# Patient Record
Sex: FEMALE | Race: WHITE | URBAN - NONMETROPOLITAN AREA
[De-identification: names, ages, dates, MRNs, and addresses within clinical notes are randomized per-mention and may not be internally consistent; named-entity substitution may affect disease eponyms.]

---

## 2020-07-07 ENCOUNTER — OFFICE VISIT (OUTPATIENT)
Dept: URBAN - NONMETROPOLITAN AREA CLINIC 2 | Facility: CLINIC | Age: 20
End: 2020-07-07

## 2020-08-05 ENCOUNTER — ERX REFILL RESPONSE (OUTPATIENT)
Age: 20
End: 2020-08-05

## 2020-08-05 RX ORDER — DEXLANSOPRAZOLE 60 MG/1
TAKE 1 CAPSULE BY MOUTH EVERY DAY CAPSULE, DELAYED RELEASE ORAL
Qty: 90 | Refills: 3

## 2020-09-04 ENCOUNTER — OFFICE VISIT (OUTPATIENT)
Dept: URBAN - NONMETROPOLITAN AREA CLINIC 13 | Facility: CLINIC | Age: 20
End: 2020-09-04
Payer: COMMERCIAL

## 2020-09-04 DIAGNOSIS — R19.7 DIARRHEA: ICD-10-CM

## 2020-09-04 DIAGNOSIS — K31.84 GASTROPARESIS: ICD-10-CM

## 2020-09-04 DIAGNOSIS — K21.0 GASTROESOPHAGEAL REFLUX DISEASE WITH ESOPHAGITIS: ICD-10-CM

## 2020-09-04 DIAGNOSIS — R11.10 REGURGITATION: ICD-10-CM

## 2020-09-04 DIAGNOSIS — K59.00 CONSTIPATION: ICD-10-CM

## 2020-09-04 PROCEDURE — G9903 PT SCRN TBCO ID AS NON USER: HCPCS | Performed by: INTERNAL MEDICINE

## 2020-09-04 PROCEDURE — G8420 CALC BMI NORM PARAMETERS: HCPCS | Performed by: INTERNAL MEDICINE

## 2020-09-04 PROCEDURE — G8427 DOCREV CUR MEDS BY ELIG CLIN: HCPCS | Performed by: INTERNAL MEDICINE

## 2020-09-04 PROCEDURE — 99214 OFFICE O/P EST MOD 30 MIN: CPT | Performed by: INTERNAL MEDICINE

## 2020-09-04 RX ORDER — IBUPROFEN 800 MG/1
TABLET ORAL
Qty: 0 | Refills: 0 | Status: ACTIVE | COMMUNITY
Start: 1900-01-01

## 2020-09-04 RX ORDER — DEXLANSOPRAZOLE 60 MG/1
TAKE 1 CAPSULE BY MOUTH EVERY DAY CAPSULE, DELAYED RELEASE ORAL
Qty: 90 | Refills: 3 | Status: ACTIVE | COMMUNITY

## 2020-09-04 RX ORDER — SERTRALINE HYDROCHLORIDE 112 UG/1
TAKE 2 TABLETS (224 MCG) BY ORAL ROUTE ONCE DAILY TABLET ORAL 1
Qty: 0 | Refills: 0 | Status: ACTIVE | COMMUNITY
Start: 1900-01-01

## 2020-09-04 RX ORDER — IBUPROFEN 200 MG/1
TABLET, COATED ORAL
Qty: 0 | Refills: 0 | Status: ACTIVE | COMMUNITY
Start: 1900-01-01

## 2020-09-04 RX ORDER — NAPROXEN SODIUM 220 MG
TABLET ORAL
Qty: 0 | Refills: 0 | Status: ACTIVE | COMMUNITY
Start: 1900-01-01

## 2020-09-04 NOTE — HPI-OTHER HISTORIES
12/3/19   Mandi presents for follow up of reflux, regurgitation, and gastroparesis. She is on reglan 10mg before supper and is eating small frequent meals. Her nausea has resolved and she is down to only regurgitating occasionally after a larger meal, she is focusing on eating small meals.  She feels that the Dexilant helped with the morning sympotms.  The motegrity has helped tremendously with the constipation and she has more of an appetite on this medication. MB

## 2020-09-04 NOTE — HPI-TODAY'S VISIT:
The patient presents today for follow-up of her gastroparesis and constipation.  Since our last visit, she has been doing fairly well on Dexilant 60 mg daily and Motegrity.  She has recently started that school, and her bowels have become more loose.  She has diarrhea on some mornings.  This usually occurs after eating.  She is also having increased nausea in the evenings.  She will feel hungry, but she is unable to eat anything, because it makes her more nauseated.  She has stopped taking the Reglan, because she felt that this gave her too many side effects.  She feels like overall, her symptoms are much improved from where they were previously, and she wants to maintain on her current medications.  We have discussed weaning off of the Dexilant, but she does not feel that she can wean down at this time.

## 2020-09-15 ENCOUNTER — ERX REFILL RESPONSE (OUTPATIENT)
Age: 20
End: 2020-09-15

## 2020-09-15 RX ORDER — PRUCALOPRIDE 2 MG/1
TAKE 1 TABLET BY MOUTH EVERY DAY TABLET, FILM COATED ORAL
Qty: 90 | Refills: 3

## 2021-01-08 ENCOUNTER — OFFICE VISIT (OUTPATIENT)
Dept: URBAN - NONMETROPOLITAN AREA CLINIC 13 | Facility: CLINIC | Age: 21
End: 2021-01-08

## 2021-04-09 ENCOUNTER — OFFICE VISIT (OUTPATIENT)
Dept: URBAN - NONMETROPOLITAN AREA CLINIC 13 | Facility: CLINIC | Age: 21
End: 2021-04-09
Payer: COMMERCIAL

## 2021-04-09 DIAGNOSIS — R11.10 REGURGITATION: ICD-10-CM

## 2021-04-09 DIAGNOSIS — K31.84 GASTROPARESIS: ICD-10-CM

## 2021-04-09 DIAGNOSIS — K59.09 CHRONIC CONSTIPATION: ICD-10-CM

## 2021-04-09 DIAGNOSIS — K21.00 CHRONIC REFLUX ESOPHAGITIS: ICD-10-CM

## 2021-04-09 PROCEDURE — 99214 OFFICE O/P EST MOD 30 MIN: CPT | Performed by: INTERNAL MEDICINE

## 2021-04-09 RX ORDER — DEXLANSOPRAZOLE 60 MG/1
TAKE 1 CAPSULE BY MOUTH EVERY DAY CAPSULE, DELAYED RELEASE ORAL
Qty: 90 | Refills: 3 | Status: ACTIVE | COMMUNITY

## 2021-04-09 RX ORDER — SERTRALINE HYDROCHLORIDE 112 UG/1
TAKE 2 TABLETS (224 MCG) BY ORAL ROUTE ONCE DAILY TABLET ORAL 1
Qty: 0 | Refills: 0 | Status: ON HOLD | COMMUNITY
Start: 1900-01-01

## 2021-04-09 RX ORDER — NAPROXEN SODIUM 220 MG
TABLET ORAL
Qty: 0 | Refills: 0 | Status: ACTIVE | COMMUNITY
Start: 1900-01-01

## 2021-04-09 RX ORDER — PRUCALOPRIDE 2 MG/1
TAKE 1 TABLET BY MOUTH EVERY DAY TABLET, FILM COATED ORAL
Qty: 90 | Refills: 3 | Status: ACTIVE | COMMUNITY

## 2021-04-09 RX ORDER — DEXLANSOPRAZOLE 60 MG/1
TAKE 1 CAPSULE BY MOUTH EVERY DAY CAPSULE, DELAYED RELEASE ORAL
OUTPATIENT

## 2021-04-09 RX ORDER — IBUPROFEN 800 MG/1
TABLET ORAL
Qty: 0 | Refills: 0 | Status: ACTIVE | COMMUNITY
Start: 1900-01-01

## 2021-04-09 RX ORDER — IBUPROFEN 200 MG/1
TABLET, COATED ORAL
Qty: 0 | Refills: 0 | Status: ACTIVE | COMMUNITY
Start: 1900-01-01

## 2021-04-09 NOTE — HPI-TODAY'S VISIT:
The patient presents today for follow-up of her gastroparesis and constipation.  Since our last visit, she has been doing fairly well on Dexilant 60 mg daily and Motegrity.  She has recently started that school, and her bowels have become more loose.  She has diarrhea on some mornings.  This usually occurs after eating.  She is also having increased nausea in the evenings.  She will feel hungry, but she is unable to eat anything, because it makes her more nauseated.  She has stopped taking the Reglan, because she felt that this gave her too many side effects.  She feels like overall, her symptoms are much improved from where they were previously, and she wants to maintain on her current medications.  We have discussed weaning off of the Dexilant, but she does not feel that she can wean down at this time. 4-9-21 The patient presents today for follow-up of her gastroparesis, reflux, and constipation.  Since our last visit, she has been doing fairly well.  She feels that her symptoms are still under good control.  She is taking the Motegrity, and she has a bowel movement the same time every day.  She does continue to have occasional regurgitation if she eats a large high-fiber meal.  She denies any significant reflux, and she is willing to try to wean down on her Dexilant to 30 mg.  We have discussed again her symptoms, and she does feel like things are under good control.  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

## 2021-09-14 ENCOUNTER — TELEPHONE ENCOUNTER (OUTPATIENT)
Dept: URBAN - NONMETROPOLITAN AREA CLINIC 2 | Facility: CLINIC | Age: 21
End: 2021-09-14

## 2021-09-14 RX ORDER — PRUCALOPRIDE 2 MG/1
TAKE 1 TABLET BY MOUTH EVERY DAY TABLET, FILM COATED ORAL
Qty: 90 | Refills: 3
End: 2022-09-09

## 2021-10-08 ENCOUNTER — WEB ENCOUNTER (OUTPATIENT)
Dept: URBAN - NONMETROPOLITAN AREA CLINIC 13 | Facility: CLINIC | Age: 21
End: 2021-10-08

## 2021-10-08 ENCOUNTER — OFFICE VISIT (OUTPATIENT)
Dept: URBAN - NONMETROPOLITAN AREA CLINIC 13 | Facility: CLINIC | Age: 21
End: 2021-10-08
Payer: COMMERCIAL

## 2021-10-08 VITALS
WEIGHT: 122 LBS | SYSTOLIC BLOOD PRESSURE: 128 MMHG | TEMPERATURE: 97.7 F | DIASTOLIC BLOOD PRESSURE: 69 MMHG | HEART RATE: 81 BPM | BODY MASS INDEX: 20.83 KG/M2 | HEIGHT: 64 IN

## 2021-10-08 DIAGNOSIS — K31.84 GASTROPARESIS: ICD-10-CM

## 2021-10-08 DIAGNOSIS — R19.7 DIARRHEA: ICD-10-CM

## 2021-10-08 DIAGNOSIS — R11.10 REGURGITATION: ICD-10-CM

## 2021-10-08 DIAGNOSIS — K59.00 CONSTIPATION: ICD-10-CM

## 2021-10-08 DIAGNOSIS — K21.0 GASTROESOPHAGEAL REFLUX DISEASE WITH ESOPHAGITIS: ICD-10-CM

## 2021-10-08 PROCEDURE — 99214 OFFICE O/P EST MOD 30 MIN: CPT | Performed by: INTERNAL MEDICINE

## 2021-10-08 RX ORDER — NAPROXEN SODIUM 220 MG
TABLET ORAL
Qty: 0 | Refills: 0 | Status: ACTIVE | COMMUNITY
Start: 1900-01-01

## 2021-10-08 RX ORDER — PRUCALOPRIDE 2 MG/1
TAKE 1 TABLET BY MOUTH EVERY DAY TABLET, FILM COATED ORAL
Qty: 90 | Refills: 3 | Status: ACTIVE | COMMUNITY
End: 2022-09-09

## 2021-10-08 RX ORDER — IBUPROFEN 800 MG/1
TABLET ORAL
Qty: 0 | Refills: 0 | Status: ACTIVE | COMMUNITY
Start: 1900-01-01

## 2021-10-08 RX ORDER — SERTRALINE HYDROCHLORIDE 112 UG/1
TAKE 2 TABLETS (224 MCG) BY ORAL ROUTE ONCE DAILY TABLET ORAL 1
Qty: 0 | Refills: 0 | Status: ON HOLD | COMMUNITY
Start: 1900-01-01

## 2021-10-08 RX ORDER — IBUPROFEN 200 MG/1
TABLET, COATED ORAL
Qty: 0 | Refills: 0 | Status: ACTIVE | COMMUNITY
Start: 1900-01-01

## 2021-10-08 NOTE — HPI-TODAY'S VISIT:
The patient presents today for follow-up of her gastroparesis and constipation.  Since our last visit, she has been doing fairly well on Dexilant 60 mg daily and Motegrity.  She has recently started that school, and her bowels have become more loose.  She has diarrhea on some mornings.  This usually occurs after eating.  She is also having increased nausea in the evenings.  She will feel hungry, but she is unable to eat anything, because it makes her more nauseated.  She has stopped taking the Reglan, because she felt that this gave her too many side effects.  She feels like overall, her symptoms are much improved from where they were previously, and she wants to maintain on her current medications.  We have discussed weaning off of the Dexilant, but she does not feel that she can wean down at this time. 4-9-21 The patient presents today for follow-up of her gastroparesis, reflux, and constipation.  Since our last visit, she has been doing fairly well.  She feels that her symptoms are still under good control.  She is taking the Motegrity, and she has a bowel movement the same time every day.  She does continue to have occasional regurgitation if she eats a large high-fiber meal.  She denies any significant reflux, and she is willing to try to wean down on her Dexilant to 30 mg.  We have discussed again her symptoms, and she does feel like things are under good control.  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 10/8/2021 The patient presents today for follow-up of her gastroparesis, reflux, and constipation.  Since her last visit, her symptoms have been stable.  She is on Motegrity and Dexilant 30 mg daily.  Her biggest issue is that she cannot add things back into her diet.  Whenever she tries to eat cruciferous vegetables, she has extreme abdominal pain.  She is also unable to tolerate certain fruits.  We have discussed trying to blenderized these and add them in a smoothie.  In the meantime, we are going to continue her Dexilant and her Motegrity.  We are going to try to wean her down to 20 mg of pantoprazole on her follow-up visit.  stretcher

## 2021-10-10 ENCOUNTER — TELEPHONE ENCOUNTER (OUTPATIENT)
Dept: URBAN - METROPOLITAN AREA CLINIC 92 | Facility: CLINIC | Age: 21
End: 2021-10-10

## 2021-11-18 ENCOUNTER — P2P PATIENT RECORD (OUTPATIENT)
Age: 21
End: 2021-11-18

## 2022-01-10 ENCOUNTER — TELEPHONE ENCOUNTER (OUTPATIENT)
Dept: URBAN - METROPOLITAN AREA CLINIC 23 | Facility: CLINIC | Age: 22
End: 2022-01-10

## 2022-01-10 ENCOUNTER — ERX REFILL RESPONSE (OUTPATIENT)
Dept: URBAN - NONMETROPOLITAN AREA CLINIC 2 | Facility: CLINIC | Age: 22
End: 2022-01-10

## 2022-01-10 RX ORDER — PRUCALOPRIDE 2 MG/1
1 TABLET TABLET, FILM COATED ORAL ONCE A DAY
Qty: 90 TABLET | Refills: 3

## 2022-01-10 RX ORDER — PRUCALOPRIDE 2 MG/1
1 TABLET TABLET, FILM COATED ORAL ONCE A DAY
Qty: 90 TABLET | Refills: 4 | OUTPATIENT

## 2022-01-10 RX ORDER — PRUCALOPRIDE 2 MG/1
TAKE 1 TABLET BY MOUTH EVERY DAY TABLET, FILM COATED ORAL
Qty: 90 | Refills: 3 | OUTPATIENT

## 2022-04-01 ENCOUNTER — WEB ENCOUNTER (OUTPATIENT)
Dept: URBAN - NONMETROPOLITAN AREA CLINIC 2 | Facility: CLINIC | Age: 22
End: 2022-04-01

## 2022-04-01 ENCOUNTER — OFFICE VISIT (OUTPATIENT)
Dept: URBAN - NONMETROPOLITAN AREA CLINIC 2 | Facility: CLINIC | Age: 22
End: 2022-04-01
Payer: COMMERCIAL

## 2022-04-01 DIAGNOSIS — K31.84 GASTROPARESIS: ICD-10-CM

## 2022-04-01 DIAGNOSIS — K21.00 CHRONIC REFLUX ESOPHAGITIS: ICD-10-CM

## 2022-04-01 DIAGNOSIS — R11.10 REGURGITATION: ICD-10-CM

## 2022-04-01 DIAGNOSIS — K59.09 CHRONIC CONSTIPATION: ICD-10-CM

## 2022-04-01 PROCEDURE — 99214 OFFICE O/P EST MOD 30 MIN: CPT | Performed by: INTERNAL MEDICINE

## 2022-04-01 RX ORDER — PRUCALOPRIDE 2 MG/1
1 TABLET TABLET, FILM COATED ORAL ONCE A DAY
Qty: 90 TABLET | Refills: 4
End: 2023-06-25

## 2022-04-01 RX ORDER — PRUCALOPRIDE 2 MG/1
1 TABLET TABLET, FILM COATED ORAL ONCE A DAY
Qty: 90 TABLET | Refills: 4 | Status: ACTIVE | COMMUNITY

## 2022-04-01 RX ORDER — NAPROXEN SODIUM 220 MG
TABLET ORAL
Qty: 0 | Refills: 0 | Status: ACTIVE | COMMUNITY
Start: 1900-01-01

## 2022-04-01 RX ORDER — IBUPROFEN 200 MG/1
TABLET, COATED ORAL
Qty: 0 | Refills: 0 | Status: ACTIVE | COMMUNITY
Start: 1900-01-01

## 2022-04-01 RX ORDER — IBUPROFEN 800 MG/1
TABLET ORAL
Qty: 0 | Refills: 0 | Status: ACTIVE | COMMUNITY
Start: 1900-01-01

## 2022-04-01 RX ORDER — PANTOPRAZOLE SODIUM 20 MG/1
1 TABLET TABLET, DELAYED RELEASE ORAL TWICE A DAY
Qty: 180 TABLET | Refills: 3 | OUTPATIENT
Start: 2022-04-01

## 2022-04-01 RX ORDER — SERTRALINE HYDROCHLORIDE 112 UG/1
TAKE 2 TABLETS (224 MCG) BY ORAL ROUTE ONCE DAILY TABLET ORAL 1
Qty: 0 | Refills: 0 | Status: ON HOLD | COMMUNITY
Start: 1900-01-01

## 2022-04-01 NOTE — HPI-TODAY'S VISIT:
The patient presents today for follow-up of her gastroparesis and constipation.  Since our last visit, she has been doing fairly well on Dexilant 60 mg daily and Motegrity.  She has recently started that school, and her bowels have become more loose.  She has diarrhea on some mornings.  This usually occurs after eating.  She is also having increased nausea in the evenings.  She will feel hungry, but she is unable to eat anything, because it makes her more nauseated.  She has stopped taking the Reglan, because she felt that this gave her too many side effects.  She feels like overall, her symptoms are much improved from where they were previously, and she wants to maintain on her current medications.  We have discussed weaning off of the Dexilant, but she does not feel that she can wean down at this time. 4-9-21 The patient presents today for follow-up of her gastroparesis, reflux, and constipation.  Since our last visit, she has been doing fairly well.  She feels that her symptoms are still under good control.  She is taking the Motegrity, and she has a bowel movement the same time every day.  She does continue to have occasional regurgitation if she eats a large high-fiber meal.  She denies any significant reflux, and she is willing to try to wean down on her Dexilant to 30 mg.  We have discussed again her symptoms, and she does feel like things are under good control.  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 10/8/2021 The patient presents today for follow-up of her gastroparesis, reflux, and constipation.  Since her last visit, her symptoms have been stable.  She is on Motegrity and Dexilant 30 mg daily.  Her biggest issue is that she cannot add things back into her diet.  Whenever she tries to eat cruciferous vegetables, she has extreme abdominal pain.  She is also unable to tolerate certain fruits.  We have discussed trying to blenderized these and add them in a smoothie.  In the meantime, we are going to continue her Dexilant and her Motegrity.  We are going to try to wean her down to 20 mg of pantoprazole on her follow-up visit. 4/1/2022 The patient presents today for follow-up of her gastroparesis, reflux, and constipation.  Since our last visit, her symptoms have been fairly stable.  She continues to try to add in fruits and vegetables to her diet, but this does tend to be difficult.  She is taking the Dexilant, and she denies any breakthrough reflux symptoms.  She does think she can wean down to 20 mg of pantoprazole.  Her bowels have been moving fairly regularly with Motegrity, but she does not have a bowel movement every day.  At this point, she agrees that her symptoms are quite stable.  She continues to work on her diet.

## 2022-06-14 ENCOUNTER — OFFICE VISIT (OUTPATIENT)
Dept: URBAN - NONMETROPOLITAN AREA CLINIC 13 | Facility: CLINIC | Age: 22
End: 2022-06-14

## 2022-10-07 ENCOUNTER — OFFICE VISIT (OUTPATIENT)
Dept: URBAN - NONMETROPOLITAN AREA CLINIC 2 | Facility: CLINIC | Age: 22
End: 2022-10-07

## 2023-01-03 ENCOUNTER — TELEPHONE ENCOUNTER (OUTPATIENT)
Dept: URBAN - NONMETROPOLITAN AREA CLINIC 2 | Facility: CLINIC | Age: 23
End: 2023-01-03

## 2023-03-27 ENCOUNTER — OFFICE VISIT (OUTPATIENT)
Dept: URBAN - NONMETROPOLITAN AREA CLINIC 2 | Facility: CLINIC | Age: 23
End: 2023-03-27
Payer: COMMERCIAL

## 2023-03-27 VITALS
HEART RATE: 64 BPM | BODY MASS INDEX: 21.85 KG/M2 | HEIGHT: 64 IN | SYSTOLIC BLOOD PRESSURE: 109 MMHG | TEMPERATURE: 98.7 F | DIASTOLIC BLOOD PRESSURE: 61 MMHG | WEIGHT: 128 LBS

## 2023-03-27 DIAGNOSIS — K21.9 GASTROESOPHAGEAL REFLUX DISEASE, UNSPECIFIED WHETHER ESOPHAGITIS PRESENT: ICD-10-CM

## 2023-03-27 DIAGNOSIS — K59.00 CONSTIPATION: ICD-10-CM

## 2023-03-27 DIAGNOSIS — K31.84 GASTROPARESIS: ICD-10-CM

## 2023-03-27 DIAGNOSIS — R11.10 REGURGITATION: ICD-10-CM

## 2023-03-27 DIAGNOSIS — R19.7 DIARRHEA: ICD-10-CM

## 2023-03-27 PROBLEM — 235595009: Status: ACTIVE | Noted: 2023-03-27

## 2023-03-27 PROCEDURE — 99214 OFFICE O/P EST MOD 30 MIN: CPT | Performed by: NURSE PRACTITIONER

## 2023-03-27 RX ORDER — IBUPROFEN 800 MG/1
TABLET ORAL
Qty: 0 | Refills: 0 | Status: ACTIVE | COMMUNITY
Start: 1900-01-01

## 2023-03-27 RX ORDER — IBUPROFEN 200 MG/1
TABLET, COATED ORAL
Qty: 0 | Refills: 0 | Status: ACTIVE | COMMUNITY
Start: 1900-01-01

## 2023-03-27 RX ORDER — PRUCALOPRIDE 2 MG/1
1 TABLET TABLET, FILM COATED ORAL ONCE A DAY
Qty: 90 TABLET | Refills: 4 | Status: ACTIVE | COMMUNITY
End: 2023-06-25

## 2023-03-27 RX ORDER — SERTRALINE HYDROCHLORIDE 112 UG/1
TAKE 2 TABLETS (224 MCG) BY ORAL ROUTE ONCE DAILY TABLET ORAL 1
Qty: 0 | Refills: 0 | Status: ON HOLD | COMMUNITY
Start: 1900-01-01

## 2023-03-27 RX ORDER — NAPROXEN SODIUM 220 MG
TABLET ORAL
Qty: 0 | Refills: 0 | Status: ACTIVE | COMMUNITY
Start: 1900-01-01

## 2023-03-27 RX ORDER — PANTOPRAZOLE SODIUM 20 MG/1
1 TABLET TABLET, DELAYED RELEASE ORAL TWICE A DAY
Qty: 180 TABLET | Refills: 3 | Status: ACTIVE | COMMUNITY
Start: 2022-04-01

## 2023-03-27 RX ORDER — PRUCALOPRIDE 2 MG/1
1 TABLET TABLET, FILM COATED ORAL ONCE A DAY
Qty: 90 TABLET | Refills: 3

## 2023-03-27 NOTE — HPI-TODAY'S VISIT:
12/3/19  Mandi presents for follow up of reflux, regurgitation, and gastroparesis. She is on reglan 10mg before supper and is eating small frequent meals. Her nausea has resolved and she is down to only regurgitating occasionally after a larger meal, she is focusing on eating small meals.  She feels that the Dexilant helped with the morning sympotms.  The motegrity has helped tremendously with the constipation and she has more of an appetite on this medication. MB  4-9-21 The patient presents today for follow-up of her gastroparesis, reflux, and constipation.  Since our last visit, she has been doing fairly well.  She feels that her symptoms are still under good control.  She is taking the Motegrity, and she has a bowel movement the same time every day.  She does continue to have occasional regurgitation if she eats a large high-fiber meal.  She denies any significant reflux, and she is willing to try to wean down on her Dexilant to 30 mg.  We have discussed again her symptoms, and she does feel like things are under good control.  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  10/8/2021 The patient presents today for follow-up of her gastroparesis, reflux, and constipation.  Since her last visit, her symptoms have been stable.  She is on Motegrity and Dexilant 30 mg daily.  Her biggest issue is that she cannot add things back into her diet.  Whenever she tries to eat cruciferous vegetables, she has extreme abdominal pain.  She is also unable to tolerate certain fruits.  We have discussed trying to blenderized these and add them in a smoothie.  In the meantime, we are going to continue her Dexilant and her Motegrity.  We are going to try to wean her down to 20 mg of pantoprazole on her follow-up visit.  4/1/2022 The patient presents today for follow-up of her gastroparesis, reflux, and constipation.  Since our last visit, her symptoms have been fairly stable.  She continues to try to add in fruits and vegetables to her diet, but this does tend to be difficult.  She is taking the Dexilant, and she denies any breakthrough reflux symptoms.  She does think she can wean down to 20 mg of pantoprazole.  Her bowels have been moving fairly regularly with Motegrity, but she does not have a bowel movement every day.  At this point, she agrees that her symptoms are quite stable.  She continues to work on her diet. 3/27/2023 Mandi presents for follow-up of gastroparesis and chronic constipation.  She is doing well on pantoprazole 20 mg daily and Motegrity 2 mg daily.  She has not been using the Reglan.  She continues to eat small frequent meals.  Today she is here for refills.  She is currently in her veterinary medicine residency.  MB

## 2023-06-06 ENCOUNTER — ERX REFILL RESPONSE (OUTPATIENT)
Dept: URBAN - NONMETROPOLITAN AREA CLINIC 2 | Facility: CLINIC | Age: 23
End: 2023-06-06

## 2024-03-27 ENCOUNTER — OV EP (OUTPATIENT)
Dept: URBAN - NONMETROPOLITAN AREA CLINIC 13 | Facility: CLINIC | Age: 24
End: 2024-03-27
Payer: COMMERCIAL

## 2024-03-27 ENCOUNTER — LAB (OUTPATIENT)
Dept: URBAN - NONMETROPOLITAN AREA CLINIC 13 | Facility: CLINIC | Age: 24
End: 2024-03-27

## 2024-03-27 VITALS
DIASTOLIC BLOOD PRESSURE: 72 MMHG | WEIGHT: 129.2 LBS | HEIGHT: 64 IN | HEART RATE: 66 BPM | TEMPERATURE: 98.7 F | SYSTOLIC BLOOD PRESSURE: 114 MMHG | BODY MASS INDEX: 22.06 KG/M2

## 2024-03-27 DIAGNOSIS — R19.7 DIARRHEA: ICD-10-CM

## 2024-03-27 DIAGNOSIS — K31.84 GASTROPARESIS: ICD-10-CM

## 2024-03-27 DIAGNOSIS — I87.1 NUTCRACKER PHENOMENON OF RENAL VEIN: ICD-10-CM

## 2024-03-27 DIAGNOSIS — K21.9 GASTROESOPHAGEAL REFLUX DISEASE, UNSPECIFIED WHETHER ESOPHAGITIS PRESENT: ICD-10-CM

## 2024-03-27 DIAGNOSIS — K59.00 CONSTIPATION: ICD-10-CM

## 2024-03-27 DIAGNOSIS — R11.10 REGURGITATION: ICD-10-CM

## 2024-03-27 PROBLEM — 16934004: Status: ACTIVE | Noted: 2024-03-27

## 2024-03-27 PROCEDURE — 99214 OFFICE O/P EST MOD 30 MIN: CPT | Performed by: NURSE PRACTITIONER

## 2024-03-27 RX ORDER — IBUPROFEN 200 MG/1
TABLET, COATED ORAL
Qty: 0 | Refills: 0 | Status: ACTIVE | COMMUNITY
Start: 1900-01-01

## 2024-03-27 RX ORDER — NAPROXEN SODIUM 220 MG
TABLET ORAL
Qty: 0 | Refills: 0 | Status: ACTIVE | COMMUNITY
Start: 1900-01-01

## 2024-03-27 RX ORDER — IBUPROFEN 800 MG/1
TABLET ORAL
Qty: 0 | Refills: 0 | Status: ACTIVE | COMMUNITY
Start: 1900-01-01

## 2024-03-27 RX ORDER — PRUCALOPRIDE 2 MG/1
1 TABLET TABLET, FILM COATED ORAL ONCE A DAY
Qty: 90 TABLET | Refills: 3 | Status: ACTIVE | COMMUNITY

## 2024-03-27 RX ORDER — FAMOTIDINE 20 MG/1
1 TABLET TABLET ORAL TWICE A DAY
Qty: 180 TABLET | Refills: 3 | OUTPATIENT
Start: 2024-03-27

## 2024-03-27 RX ORDER — PRUCALOPRIDE 2 MG/1
1 TABLET TABLET, FILM COATED ORAL ONCE A DAY
Qty: 90 TABLET | Refills: 3
End: 2025-03-22

## 2024-03-27 RX ORDER — SERTRALINE HYDROCHLORIDE 112 UG/1
TAKE 2 TABLETS (224 MCG) BY ORAL ROUTE ONCE DAILY TABLET ORAL 1
Qty: 0 | Refills: 0 | Status: ON HOLD | COMMUNITY
Start: 1900-01-01

## 2024-03-27 NOTE — HPI-TODAY'S VISIT:
12/3/19  Mandi presents for follow up of reflux, regurgitation, and gastroparesis. She is on reglan 10mg before supper and is eating small frequent meals. Her nausea has resolved and she is down to only regurgitating occasionally after a larger meal, she is focusing on eating small meals.  She feels that the Dexilant helped with the morning sympotms.  The motegrity has helped tremendously with the constipation and she has more of an appetite on this medication. MB  4-9-21 The patient presents today for follow-up of her gastroparesis, reflux, and constipation.  Since our last visit, she has been doing fairly well.  She feels that her symptoms are still under good control.  She is taking the Motegrity, and she has a bowel movement the same time every day.  She does continue to have occasional regurgitation if she eats a large high-fiber meal.  She denies any significant reflux, and she is willing to try to wean down on her Dexilant to 30 mg.  We have discussed again her symptoms, and she does feel like things are under good control.  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  10/8/2021 The patient presents today for follow-up of her gastroparesis, reflux, and constipation.  Since her last visit, her symptoms have been stable.  She is on Motegrity and Dexilant 30 mg daily.  Her biggest issue is that she cannot add things back into her diet.  Whenever she tries to eat cruciferous vegetables, she has extreme abdominal pain.  She is also unable to tolerate certain fruits.  We have discussed trying to blenderized these and add them in a smoothie.  In the meantime, we are going to continue her Dexilant and her Motegrity.  We are going to try to wean her down to 20 mg of pantoprazole on her follow-up visit.  4/1/2022 The patient presents today for follow-up of her gastroparesis, reflux, and constipation.  Since our last visit, her symptoms have been fairly stable.  She continues to try to add in fruits and vegetables to her diet, but this does tend to be difficult.  She is taking the Dexilant, and she denies any breakthrough reflux symptoms.  She does think she can wean down to 20 mg of pantoprazole.  Her bowels have been moving fairly regularly with Motegrity, but she does not have a bowel movement every day.  At this point, she agrees that her symptoms are quite stable.  She continues to work on her diet. 3/27/2023 Mandi presents for follow-up of gastroparesis and chronic constipation.  She is doing well on pantoprazole 20 mg daily and Motegrity 2 mg daily.  She has not been using the Reglan.  She continues to eat small frequent meals.  Today she is here for refills.  She is currently in her veterinary medicine residency.  MB 3/27/2024 Roxanne presents for follow-up.  She is doing great on Motegrity 2 mg daily and has a bowel movement every other day.  Her regurgitation and reflux are stable on pantoprazole 20 mg daily and she agrees to try and wean down to Pepcid 20 mg daily.  She does have episodes of left upper quadrant pulsating which feels like possibly a spasm.  This is not necessarily related to eating.  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  She also reports pulsation of her abdomen.  We will check a mesenteric Doppler, consider vascular surgery follow-up if there is any abnormality.  She states that this does not cause pain but she does notice the symptoms.  MB

## 2024-03-28 LAB
A/G RATIO: 1.8
ALBUMIN: 4.6
ALKALINE PHOSPHATASE: 48
ALT (SGPT): 12
AST (SGOT): 17
BASO (ABSOLUTE): 0.1
BASOS: 2
BILIRUBIN, TOTAL: 0.5
BUN/CREATININE RATIO: 14
BUN: 13
CALCIUM: 9.9
CARBON DIOXIDE, TOTAL: 26
CHLORIDE: 103
CREATININE: 0.93
EGFR: 87
EOS (ABSOLUTE): 0.1
EOS: 3
GLOBULIN, TOTAL: 2.6
GLUCOSE: 70
HEMATOCRIT: 41.7
HEMATOLOGY COMMENTS:: (no result)
HEMOGLOBIN: 13.2
IMMATURE CELLS: (no result)
IMMATURE GRANS (ABS): 0
IMMATURE GRANULOCYTES: 0
LYMPHS (ABSOLUTE): 1.8
LYMPHS: 44
MCH: 28.6
MCHC: 31.7
MCV: 90
MONOCYTES(ABSOLUTE): 0.4
MONOCYTES: 8
NEUTROPHILS (ABSOLUTE): 1.8
NEUTROPHILS: 43
NRBC: (no result)
PLATELETS: 295
POTASSIUM: 5.3
PROTEIN, TOTAL: 7.2
RBC: 4.62
RDW: 13.1
SODIUM: 139
WBC: 4.2

## 2024-07-25 ENCOUNTER — ERX REFILL RESPONSE (OUTPATIENT)
Dept: URBAN - NONMETROPOLITAN AREA CLINIC 13 | Facility: CLINIC | Age: 24
End: 2024-07-25

## 2024-07-25 RX ORDER — PRUCALOPRIDE 2 MG/1
1 TABLET TABLET, FILM COATED ORAL ONCE A DAY
Qty: 90 TABLET | Refills: 3 | OUTPATIENT

## 2024-07-26 ENCOUNTER — TELEPHONE ENCOUNTER (OUTPATIENT)
Dept: URBAN - NONMETROPOLITAN AREA CLINIC 2 | Facility: CLINIC | Age: 24
End: 2024-07-26

## 2024-07-26 ENCOUNTER — ERX REFILL RESPONSE (OUTPATIENT)
Dept: URBAN - NONMETROPOLITAN AREA CLINIC 13 | Facility: CLINIC | Age: 24
End: 2024-07-26

## 2024-07-26 RX ORDER — PRUCALOPRIDE 2 MG/1
1 TABLET TABLET, FILM COATED ORAL ONCE A DAY
Qty: 90 TABLET | Refills: 3 | OUTPATIENT